# Patient Record
(demographics unavailable — no encounter records)

---

## 2025-02-20 NOTE — PHYSICAL EXAM
[Normal] : mucosa is normal [Midline] : trachea located in midline position [de-identified] : bilateral cerumen impaction- cleaned away with suction/curette with no issues.

## 2025-02-20 NOTE — DATA REVIEWED
[de-identified] : 1/13/2024 AUDIOGRAM: Essn mild to moderate SNHL AU TYMPS Right: Type A Left: Type A and Complete Audio Evaluation.

## 2025-02-20 NOTE — ASSESSMENT
[FreeTextEntry1] : - 59M who presents with cerumen impaction and bilateral sensorineural hearing loss. No cerumen on examination today.  At this point I am recommending observation and follow-up in 6 months.     Exam today is normal without evidence of cerumen.  Will order audiogram and tympanogram and follow-up after to review those results.  Additionally patient is interested in consultation for inspire.  He does not like using his CPAP.  I gave patient information for evaluation with Dr. Dewey.  6/4/24: He is here for routine ear cleaning. On exam there is evidence of cerumen impaction. This was cleaned today without issue. Patient noted immediate improvement back to baseline. For known hearing loss I am recommending audiogram/ tympanogram. Follow up after for review.  2/20/25: Audiogram revealed bilateral mild to moderate sensorineural hearing loss, right asymmetry from 1-4 kHz, bilateral normal tympanograms.  He overall feels right ear is worse. Bilateral cerumen removed without issue. For right asymmetry we discussed options for further workup to rule out retrocochlear process including ABR versus MRI. He wishes to set up ABR. Follow up after for review.   For his nose I recommended saline rinses. If he is still symptomatic at next visit consider additional nasal sprays.  -ABR  -follow-up after above for review -nasal saline rinses    
04-Jun-2018

## 2025-02-20 NOTE — HISTORY OF PRESENT ILLNESS
[de-identified] : 58M known to my partner for hearing loss and cerumen impaction.  Previous had ears cleaned followed by audiogram.  He presents today for re-evaluation and to review the audiogram.  He denies any tinnitus, otalgia, otorrhea, vertiginous symptoms.  No significant noise exposure in the past.  No q-tip use.  No other ENT complaints.   After his audio, he has been doing a hearing aid trial. - Update 10/18/21 Overall stable and well.  Pt does have some headaches and therefore has not been using HA as much as he would like.  He presents for routine ear cleaning today.  No new ENT issues otherwise. - Update 4/25/22 Overall stable and well.  Pt does have some headaches and therefore has not been using HA as much as he would like.  He presents for routine ear cleaning today.  No new ENT issues otherwise. - Update 7/11/22 Recently with COVID infection has recovered other than some loss of smell which his coming back. Otherwise, overall stable and well. He presents for routine ear cleaning today. He has No new ENT issues otherwise. - 11/1/2022 Patient overall stable and well.  No change in symptoms.  Currently due for audiogram and tympanogram.  Patient would also like consultation for potential inspire implant as he does not like his CPAP.  No new ear, nose, throat symptoms.  Patient presents for routine ear cleaning. - 6/4/24: Patient with history of known hearing loss presents for reevaluation and routine ear cleaning. He is due for annual audiogram/ tympanogram, last audio was in 11/2020. He denies tinnitus, otalgia, otorrhea, vertiginous symptoms. -qtips.  - [FreeTextEntry1] : 2/20/25: Patient completed audio/ tymps and presents to review results. He overall feels right hearing is overall worse.   He also presents with some increased nasal secretions.

## 2025-02-20 NOTE — PROCEDURE
[FreeTextEntry3] : -\par  Cerumen Removal/Ear Cleaning for Otitis Externa\par  Pre-operative Diagnosis: bilateral Cerumen Impaction\par  Post-operative Diagnosis: Same\par  Procedure:  Binocular microscopy with cerumen removal- 44855\par  Procedure Details:  \par  The patient was placed in the supine position.  The operating microscope was positioned.  I then placed the ear speculum in the EAC.  Cerumen was then removed using a mixture of otologic curettes, and suction.  The TM was noted to be intact. I then performed the procedure of the opposite ear in similar fashion.  The patient tolerated procedure well.\par  \par  Findings: \par  Bilateral Ear Canal - normal\par  Bilateral Tympanic Membrane - normal\par  \par  Recommendations: Debrox\par  Complications: None\par  \par